# Patient Record
Sex: MALE | Race: WHITE | Employment: FULL TIME
[De-identification: names, ages, dates, MRNs, and addresses within clinical notes are randomized per-mention and may not be internally consistent; named-entity substitution may affect disease eponyms.]

---

## 2023-03-01 ENCOUNTER — NURSE TRIAGE (OUTPATIENT)
Dept: OTHER | Facility: CLINIC | Age: 38
End: 2023-03-01

## 2023-03-01 NOTE — TELEPHONE ENCOUNTER
Location of patient: [address] call from [place] [facility]; Patient with Red Flag Complaint requesting to establish care with new PCP at [facility]. Limited Triage d/t patient not with caller    Subjective: Caller states \"He's had this varicose vein from a soccer injury in high school. [facility]  and he said if it wasn't bothering him, it shouldn't be a an issue. Yesterday it started swelling up and throbbing and it's hurting. \"     Current Symptoms: varicose vein in right leg-throbbing, leg swelling-ankle to knee on right leg, NO color change, NO difficulty breathing    No personal hx of blood clot, mother has hx of blood clot after surgery    Onset: 2 days ago; worsening    Associated Symptoms: reduced activity    Pain Severity: Unable to rate d/t patient not with caller    Temperature: Denies    What has been tried: Nothing    LMP: NA Pregnant: NA    Recommended disposition: Go to ED/UCC Now (Or to Office with PCP Approval). Caller agreeable. Care advice provided, patient verbalizes understanding; denies any other questions or concerns; instructed to call back for any new or worsening symptoms. Patient/caller agrees to proceed to [facility] Emergency Department. Attention Provider: Thank you for allowing me to participate in the care of your patient. The patient was connected to triage in response to information provided to the ECC. Please do not respond through this encounter as the response is not directed to a shared pool.     Reason for Disposition   Thigh or calf pain and only 1 side and present > 1 hour    Protocols used: Leg Swelling and Edema-ADULT-[place]

## 2023-03-03 ENCOUNTER — TELEPHONE (OUTPATIENT)
Dept: VASCULAR SURGERY | Facility: CLINIC | Age: 38
End: 2023-03-03
Payer: COMMERCIAL

## 2023-03-06 ENCOUNTER — OFFICE VISIT (OUTPATIENT)
Dept: VASCULAR SURGERY | Facility: CLINIC | Age: 38
End: 2023-03-06
Payer: COMMERCIAL

## 2023-03-06 VITALS
HEIGHT: 68 IN | HEART RATE: 84 BPM | OXYGEN SATURATION: 97 % | BODY MASS INDEX: 34.86 KG/M2 | WEIGHT: 230 LBS | DIASTOLIC BLOOD PRESSURE: 82 MMHG | SYSTOLIC BLOOD PRESSURE: 132 MMHG

## 2023-03-06 DIAGNOSIS — R60.0 EDEMA OF BOTH LOWER LEGS: ICD-10-CM

## 2023-03-06 DIAGNOSIS — I87.2 VENOUS (PERIPHERAL) INSUFFICIENCY: ICD-10-CM

## 2023-03-06 DIAGNOSIS — I83.11 VARICOSE VEINS OF RIGHT LOWER EXTREMITY WITH INFLAMMATION: Primary | ICD-10-CM

## 2023-03-06 PROCEDURE — 99204 OFFICE O/P NEW MOD 45 MIN: CPT | Performed by: SURGERY

## 2023-03-06 RX ORDER — ASPIRIN 81 MG/1
81 TABLET ORAL DAILY
COMMUNITY

## 2023-03-06 NOTE — PROGRESS NOTES
03/06/2023      Referring, [place] [address]    Silverio Ortiz  1985    Chief Complaint   Patient presents with   • NEW PATIENT     Self referring for varicose veins. Patient states that most issue is in right leg. Started using treadmill and that's when he started noticing pain. Patient has not tried wearing compressions.        Dear Self Referring:      HPI  I had the pleasure of seeing your patient Silverio Ortiz in the office today.  Thank you kindly for this consultation.  As you recall, Silverio Ortiz is a 37 y.o.  male who you are currently following for general medical care.  He is referred to the vascular surgery office today for evaluation of some lower extremity edema and varicose veins, mainly in the right lower extremity.  She notes the presence of varicose veins in the right medial thigh and lower leg for several years however more recently that he has noted they have become more prominent and at times giving him some discomfort.  This is especially with prolonged standing or at times with some exercise.  Lower extremity edema is somewhat improved with leg elevation and lying supine.  He otherwise denies any arterial symptoms with no claudication or rest pain.  He has no history of DVT.  He otherwise has no significant past medical history and does not take any regular medications other than an 81 mg aspirin daily.    History reviewed. No pertinent past medical history.    History reviewed. No pertinent surgical history.    Family History   Problem Relation Age of Onset   • Atrial fibrillation Mother    • Stroke Mother        Social History     Socioeconomic History   • Marital status:    Tobacco Use   • Smoking status: Never   Substance and Sexual Activity   • Alcohol use: Yes     Alcohol/week: 10.0 standard drinks     Types: 5 Cans of beer, 5 Shots of liquor per week   • Drug use: Never   • Sexual activity: Yes     Partners: Female     Birth  "control/protection: None       No Known Allergies    Prior to Admission medications    Medication Sig Start Date End Date Taking? Authorizing Provider   aspirin 81 MG EC tablet Take 1 tablet by mouth Daily.   Yes Provider, Historical, MD       Review of Systems   Constitutional: Negative.  Negative for activity change, appetite change, chills, diaphoresis, fatigue and fever.   HENT: Negative.  Negative for congestion, sneezing, sore throat and trouble swallowing.    Eyes: Negative.  Negative for visual disturbance.   Respiratory: Negative.  Negative for chest tightness and shortness of breath.    Cardiovascular: Positive for leg swelling. Negative for chest pain and palpitations.   Gastrointestinal: Negative.  Negative for abdominal distention, abdominal pain, nausea and vomiting.   Endocrine: Negative.    Genitourinary: Negative.    Musculoskeletal: Negative.    Skin: Negative.         Varicose veins mainly on the right lower extremity.  At times these are uncomfortable with prolonged standing and sometimes with exercise.   Allergic/Immunologic: Negative.    Neurological: Negative.    Hematological: Negative.    Psychiatric/Behavioral: Negative.        /82 (BP Location: Right arm, Patient Position: Sitting, Cuff Size: Adult)   Pulse 84   Ht 172.7 cm (68\")   Wt 104 kg (230 lb)   SpO2 97%   BMI 34.97 kg/m²   Physical Exam  Vitals reviewed.   Constitutional:       Appearance: Normal appearance.   HENT:      Head: Normocephalic and atraumatic.      Nose: Nose normal.      Mouth/Throat:      Mouth: Mucous membranes are moist.   Eyes:      Extraocular Movements: Extraocular movements intact.      Pupils: Pupils are equal, round, and reactive to light.   Cardiovascular:      Rate and Rhythm: Normal rate and regular rhythm.      Pulses: Normal pulses.           Carotid pulses are 2+ on the right side and 2+ on the left side.       Radial pulses are 2+ on the right side and 2+ on the left side.        Femoral " pulses are 2+ on the right side and 2+ on the left side.       Popliteal pulses are 2+ on the right side and 2+ on the left side.        Dorsalis pedis pulses are 2+ on the right side and 2+ on the left side.        Posterior tibial pulses are 2+ on the right side and 2+ on the left side.      Comments: Mild edema of the bilateral lower extremities from ankle to knee.  Slightly worse on the right compared to the left.  He has some varicosities in the right lower extremity in the medial thigh as well as the medial lower leg.  These are supple with no significant overlying skin changes.  He otherwise has palpable pulses throughout the bilateral lower extremities.  Pulmonary:      Effort: Pulmonary effort is normal. No respiratory distress.   Abdominal:      General: There is no distension.      Palpations: Abdomen is soft. There is no mass.      Tenderness: There is no abdominal tenderness.   Musculoskeletal:         General: Normal range of motion.      Cervical back: Normal range of motion and neck supple.      Right lower leg: Edema present.      Left lower leg: Edema present.   Skin:     General: Skin is warm and dry.      Capillary Refill: Capillary refill takes less than 2 seconds.   Neurological:      General: No focal deficit present.      Mental Status: He is alert and oriented to person, place, and time.   Psychiatric:         Mood and Affect: Mood normal.         Behavior: Behavior normal.         Thought Content: Thought content normal.         Judgment: Judgment normal.         No results found.    There is no problem list on file for this patient.        ICD-10-CM ICD-9-CM   1. Varicose veins of right lower extremity with inflammation  I83.11 454.1   2. Venous (peripheral) insufficiency  I87.2 459.81   3. Edema of both lower legs  R60.0 782.3       Lab Frequency Next Occurrence   US Venous Doppler Lower Extremity Bilateral (duplex) Once 06/04/2023       Plan: After thoroughly evaluating Silverio  Angel, I believe the best course of action is to initially remain conservative from a vascular standpoint.  Cynically he has evidence of venous insufficiency with some edema that is worse with prolonged standing and somewhat improved with leg elevation and lying supine.  He also has some symptomatic varicosities mainly in the right lower extremity that have been more prominent recently. I will give the patient a prescription for compression stockings in the 20-30 mm pressure gradient range.  I did instruct the patient on how to wear these on a daily basis.  I would like him to keep his legs elevated when he is not on them, and keep his legs well moisturized.  We will see the patient back in 3 months with a venous valvular insufficiency study. If the testing does show significant venous reflux, the patient may be a great candidate for endovenous closure as the patient's symptoms have significantly impacted their activities of daily living.  The patient can continue taking their current medication regimen as previously planned.  BMI today is 34.9.  I will have him follow-up with his primary care physician for further evaluation and recommended proper nutrition and exercise to help improve this. This was all discussed in full with complete understanding.    Thank you for allowing me to participate in the care of your patient.  Please do not hesitate with any questions or concerns.  I will keep you aware of any further encounters with Silverio Ortiz.        Sincerely yours,         Arnaldo Albarado MD

## 2023-03-07 ENCOUNTER — PATIENT ROUNDING (BHMG ONLY) (OUTPATIENT)
Dept: VASCULAR SURGERY | Facility: CLINIC | Age: 38
End: 2023-03-07
Payer: COMMERCIAL

## 2023-03-07 NOTE — PROGRESS NOTES
March 7, 2023    Hello, may I speak with Silverio Ortiz?    My name is MAXIMO      I am  with [facility]  [address], [address]  [place] [address] [phone].    Before we get started may I verify your date of birth? 1985    I am calling to officially welcome you to our practice and ask about your recent visit. Is this a good time to talk? yes    Tell me about your visit with us. What things went well?  GOOD!       We're always looking for ways to make our patients' experiences even better. Do you have recommendations on ways we may improve?  no, WE WERE INFORMED THAT [place] WAS IN SURGERY BUT IT STILL WASN'T THAT LONG OF A WAIT.    Overall were you satisfied with your first visit to our practice? yes       I appreciate you taking the time to speak with me today. Is there anything else I can do for you? no      Thank you, and have a great day.

## 2023-06-09 ENCOUNTER — OFFICE VISIT (OUTPATIENT)
Dept: VASCULAR SURGERY | Facility: CLINIC | Age: 38
End: 2023-06-09
Payer: COMMERCIAL

## 2023-06-09 ENCOUNTER — HOSPITAL ENCOUNTER (OUTPATIENT)
Dept: ULTRASOUND IMAGING | Facility: HOSPITAL | Age: 38
Discharge: HOME | End: 2023-06-09
Payer: COMMERCIAL

## 2023-06-09 VITALS
HEART RATE: 78 BPM | OXYGEN SATURATION: 98 % | HEIGHT: 68 IN | SYSTOLIC BLOOD PRESSURE: 128 MMHG | WEIGHT: 230.2 LBS | BODY MASS INDEX: 34.89 KG/M2 | RESPIRATION RATE: 18 BRPM | DIASTOLIC BLOOD PRESSURE: 76 MMHG

## 2023-06-09 DIAGNOSIS — I83.11 VARICOSE VEINS OF RIGHT LOWER EXTREMITY WITH INFLAMMATION: ICD-10-CM

## 2023-06-09 DIAGNOSIS — I87.2 VENOUS (PERIPHERAL) INSUFFICIENCY: ICD-10-CM

## 2023-06-09 DIAGNOSIS — R60.0 EDEMA OF BOTH LOWER LEGS: ICD-10-CM

## 2023-06-09 DIAGNOSIS — I83.11 VARICOSE VEINS OF RIGHT LOWER EXTREMITY WITH INFLAMMATION: Primary | ICD-10-CM

## 2023-06-09 DIAGNOSIS — I87.323 CHRONIC VENOUS HYPERTENSION WITH INFLAMMATION INVOLVING BOTH SIDES: ICD-10-CM

## 2023-06-09 PROCEDURE — 93970 EXTREMITY STUDY: CPT

## 2023-06-09 NOTE — PROGRESS NOTES
06/09/2023      Hari Saavedra DO  [address]  [place] [address]        Silverio Ortiz  1985    Chief Complaint   Patient presents with   • Varicose veins of right lower extremity with inflammation     Patient states that he is here for a FU, no issues.        Dear Hari Saavedra DO:    HPI     I had the pleasure of seeing your patient in the office today for follow up.  As you recall, the patient is a 38 y.o. male who we are currently following for lower extremity edema and symptomatic varicose veins.  He returns today after having undergone venous duplex with insufficiency study which I did personally review in the office.  It does show evidence of significant reflux in the right greater saphenous vein as well as an anterior accessory saphenous vein.  There was also deep venous reflux in the common femoral and popliteal veins.  On the left there is significant reflux in the greater saphenous vein only.  There was no evidence of DVT.  Since our last visit he has been wearing compression in the 20 to 30 mmHg range as well as using leg elevation and exercise.  He notes this improves his symptoms with less edema and heaviness as well as less discomfort.  However even with compression he still does continue to have some symptoms and some discomfort especially in the varicosities in the right lower extremity.  He otherwise denies any arterial symptoms with no claudication or rest pain.  He has no other acute complaints today.      Review of Systems   Constitutional: Negative.  Negative for activity change, appetite change, chills, diaphoresis, fatigue and fever.   HENT: Negative.  Negative for congestion, sneezing, sore throat and trouble swallowing.    Eyes: Negative.  Negative for visual disturbance.   Respiratory: Negative.  Negative for chest tightness and shortness of breath.    Cardiovascular: Positive for leg swelling. Negative for chest pain and palpitations.   Gastrointestinal: Negative.   "Negative for abdominal distention, abdominal pain, nausea and vomiting.   Endocrine: Negative.    Genitourinary: Negative.    Musculoskeletal: Negative.    Skin: Negative.         Varicose veins mainly on the right lower extremity.  At times these are uncomfortable with prolonged standing and sometimes with exercise.   Allergic/Immunologic: Negative.    Neurological: Negative.    Hematological: Negative.    Psychiatric/Behavioral: Negative.        /76 (BP Location: Right arm, Patient Position: Sitting, Cuff Size: Adult)   Pulse 78   Resp 18   Ht 172.7 cm (68\")   Wt 104 kg (230 lb 3.2 oz)   SpO2 98%   BMI 35.00 kg/m²   Physical Exam  Vitals reviewed.   Constitutional:       Appearance: Normal appearance.   HENT:      Head: Normocephalic and atraumatic.      Nose: Nose normal.      Mouth/Throat:      Mouth: Mucous membranes are moist.   Eyes:      Extraocular Movements: Extraocular movements intact.      Pupils: Pupils are equal, round, and reactive to light.   Cardiovascular:      Rate and Rhythm: Normal rate and regular rhythm.      Pulses: Normal pulses.           Carotid pulses are 2+ on the right side and 2+ on the left side.       Radial pulses are 2+ on the right side and 2+ on the left side.        Femoral pulses are 2+ on the right side and 2+ on the left side.       Popliteal pulses are 2+ on the right side and 2+ on the left side.        Dorsalis pedis pulses are 2+ on the right side and 2+ on the left side.        Posterior tibial pulses are 2+ on the right side and 2+ on the left side.      Comments: Mild edema of the bilateral lower extremities from ankle to knee.  Slightly worse on the right compared to the left.  He has some varicosities in the right lower extremity in the medial thigh as well as the medial lower leg.  These are supple with no significant overlying skin changes.  He otherwise has palpable pulses throughout the bilateral lower extremities.  Pulmonary:      Effort: Pulmonary " effort is normal. No respiratory distress.   Abdominal:      General: There is no distension.      Palpations: Abdomen is soft. There is no mass.      Tenderness: There is no abdominal tenderness.   Musculoskeletal:         General: Normal range of motion.      Cervical back: Normal range of motion and neck supple.      Right lower leg: Edema present.      Left lower leg: Edema present.   Skin:     General: Skin is warm and dry.      Capillary Refill: Capillary refill takes less than 2 seconds.   Neurological:      General: No focal deficit present.      Mental Status: He is alert and oriented to person, place, and time.   Psychiatric:         Mood and Affect: Mood normal.         Behavior: Behavior normal.         Thought Content: Thought content normal.         Judgment: Judgment normal.         DIAGNOSTIC DATA:        No results found.    There is no problem list on file for this patient.        ICD-10-CM ICD-9-CM   1. Varicose veins of right lower extremity with inflammation  I83.11 454.1   2. Venous (peripheral) insufficiency  I87.2 459.81   3. Edema of both lower legs  R60.0 782.3   4. Chronic venous hypertension with inflammation involving both sides  I87.323 459.32           PLAN: After thoroughly evaluating Silverio Ortiz, I believe the best course of action is to proceed with right lower extremity greater saphenous vein and anterior accessory saphenous vein radiofrequency ablation.  He returns today after having undergone venous duplex with insufficiency study which I did personally review.  It does show evidence of significant reflux in the bilateral greater saphenous veins, as well as a right anterior accessory saphenous vein.  There was also evidence of deep venous reflux on the right in the common femoral and popliteal veins.  Since our last visit 3 months ago he has been wearing compression in the 20 to 30 mmHg range to the bilateral lower extremities on a daily basis and also using leg elevation  and exercise.  While this has worked to improve his symptoms, he still has heaviness, discomfort, and symptoms in his varicosities especially in the right lower extremity.  Given his ongoing symptoms despite conservative management I think he would benefit from right greater saphenous and anterior accessory saphenous vein radiofrequency ablation. Risks of radiofrequency ablation include, but are not limited to, bleeding, infection, vessel damage, nerve damage, DVT, phlebitis, and pulmonary embolus.  The patient understands these risks and wishes to proceed with procedure.  In the meantime he should continue his compression in the 20 to 30 mmHg range on a daily basis, along with his leg elevation and exercise to continue managing his symptoms.  The patient is to continue taking their medications as previously discussed. Class 2 Severe Obesity (BMI >=35 and <=39.9). Obesity-related health conditions include the following: lower extremity venous stasis disease. Obesity is unchanged. BMI is is above average; BMI management plan is completed. We discussed portion control and increasing exercise. This was all discussed in full with complete understanding.  Thank you for allowing me to participate in the care of your patient.  Please do not hesitate to call with any questions or concerns.  We will keep you aware of any further encounters with Silverio Ortiz.      Sincerely Yours,      Arnaldo Albarado MD

## 2023-06-09 NOTE — H&P
HPI     I had the pleasure of seeing your patient in the office today for follow up.  As you recall, the patient is a 38 y.o. male who we are currently following for lower extremity edema and symptomatic varicose veins.  He returns today after having undergone venous duplex with insufficiency study which I did personally review in the office.  It does show evidence of significant reflux in the right greater saphenous vein as well as an anterior accessory saphenous vein.  There was also deep venous reflux in the common femoral and popliteal veins.  On the left there is significant reflux in the greater saphenous vein only.  There was no evidence of DVT.  Since our last visit he has been wearing compression in the 20 to 30 mmHg range as well as using leg elevation and exercise.  He notes this improves his symptoms with less edema and heaviness as well as less discomfort.  However even with compression he still does continue to have some symptoms and some discomfort especially in the varicosities in the right lower extremity.  He otherwise denies any arterial symptoms with no claudication or rest pain.  He has no other acute complaints today.      Review of Systems   Constitutional: Negative.  Negative for activity change, appetite change, chills, diaphoresis, fatigue and fever.   HENT: Negative.  Negative for congestion, sneezing, sore throat and trouble swallowing.    Eyes: Negative.  Negative for visual disturbance.   Respiratory: Negative.  Negative for chest tightness and shortness of breath.    Cardiovascular: Positive for leg swelling. Negative for chest pain and palpitations.   Gastrointestinal: Negative.  Negative for abdominal distention, abdominal pain, nausea and vomiting.   Endocrine: Negative.    Genitourinary: Negative.    Musculoskeletal: Negative.    Skin: Negative.         Varicose veins mainly on the right lower extremity.  At times these are uncomfortable with prolonged standing and sometimes with  "exercise.   Allergic/Immunologic: Negative.    Neurological: Negative.    Hematological: Negative.    Psychiatric/Behavioral: Negative.        /76 (BP Location: Right arm, Patient Position: Sitting, Cuff Size: Adult)   Pulse 78   Resp 18   Ht 172.7 cm (68\")   Wt 104 kg (230 lb 3.2 oz)   SpO2 98%   BMI 35.00 kg/m²   Physical Exam  Vitals reviewed.   Constitutional:       Appearance: Normal appearance.   HENT:      Head: Normocephalic and atraumatic.      Nose: Nose normal.      Mouth/Throat:      Mouth: Mucous membranes are moist.   Eyes:      Extraocular Movements: Extraocular movements intact.      Pupils: Pupils are equal, round, and reactive to light.   Cardiovascular:      Rate and Rhythm: Normal rate and regular rhythm.      Pulses: Normal pulses.           Carotid pulses are 2+ on the right side and 2+ on the left side.       Radial pulses are 2+ on the right side and 2+ on the left side.        Femoral pulses are 2+ on the right side and 2+ on the left side.       Popliteal pulses are 2+ on the right side and 2+ on the left side.        Dorsalis pedis pulses are 2+ on the right side and 2+ on the left side.        Posterior tibial pulses are 2+ on the right side and 2+ on the left side.      Comments: Mild edema of the bilateral lower extremities from ankle to knee.  Slightly worse on the right compared to the left.  He has some varicosities in the right lower extremity in the medial thigh as well as the medial lower leg.  These are supple with no significant overlying skin changes.  He otherwise has palpable pulses throughout the bilateral lower extremities.  Pulmonary:      Effort: Pulmonary effort is normal. No respiratory distress.   Abdominal:      General: There is no distension.      Palpations: Abdomen is soft. There is no mass.      Tenderness: There is no abdominal tenderness.   Musculoskeletal:         General: Normal range of motion.      Cervical back: Normal range of motion and neck " supple.      Right lower leg: Edema present.      Left lower leg: Edema present.   Skin:     General: Skin is warm and dry.      Capillary Refill: Capillary refill takes less than 2 seconds.   Neurological:      General: No focal deficit present.      Mental Status: He is alert and oriented to person, place, and time.   Psychiatric:         Mood and Affect: Mood normal.         Behavior: Behavior normal.         Thought Content: Thought content normal.         Judgment: Judgment normal.         DIAGNOSTIC DATA:        No results found.    There is no problem list on file for this patient.        ICD-10-CM ICD-9-CM   1. Varicose veins of right lower extremity with inflammation  I83.11 454.1   2. Venous (peripheral) insufficiency  I87.2 459.81   3. Edema of both lower legs  R60.0 782.3   4. Chronic venous hypertension with inflammation involving both sides  I87.323 459.32           PLAN: After thoroughly evaluating Silverio Ortiz, I believe the best course of action is to proceed with right lower extremity greater saphenous vein and anterior accessory saphenous vein radiofrequency ablation.  He returns today after having undergone venous duplex with insufficiency study which I did personally review.  It does show evidence of significant reflux in the bilateral greater saphenous veins, as well as a right anterior accessory saphenous vein.  There was also evidence of deep venous reflux on the right in the common femoral and popliteal veins.  Since our last visit 3 months ago he has been wearing compression in the 20 to 30 mmHg range to the bilateral lower extremities on a daily basis and also using leg elevation and exercise.  While this has worked to improve his symptoms, he still has heaviness, discomfort, and symptoms in his varicosities especially in the right lower extremity.  Given his ongoing symptoms despite conservative management I think he would benefit from right greater saphenous and anterior accessory  saphenous vein radiofrequency ablation. Risks of radiofrequency ablation include, but are not limited to, bleeding, infection, vessel damage, nerve damage, DVT, phlebitis, and pulmonary embolus.  The patient understands these risks and wishes to proceed with procedure.  In the meantime he should continue his compression in the 20 to 30 mmHg range on a daily basis, along with his leg elevation and exercise to continue managing his symptoms.  The patient is to continue taking their medications as previously discussed. Class 2 Severe Obesity (BMI >=35 and <=39.9). Obesity-related health conditions include the following: lower extremity venous stasis disease. Obesity is unchanged. BMI is is above average; BMI management plan is completed. We discussed portion control and increasing exercise. This was all discussed in full with complete understanding.

## 2023-06-12 ENCOUNTER — TELEPHONE (OUTPATIENT)
Dept: VASCULAR SURGERY | Facility: CLINIC | Age: 38
End: 2023-06-12
Payer: COMMERCIAL

## 2023-06-12 PROBLEM — I87.2 VENOUS (PERIPHERAL) INSUFFICIENCY: Status: ACTIVE | Noted: 2023-06-12

## 2023-06-12 PROBLEM — I87.323 CHRONIC VENOUS HYPERTENSION WITH INFLAMMATION INVOLVING BOTH SIDES: Status: ACTIVE | Noted: 2023-06-12

## 2023-06-12 PROBLEM — R60.0 EDEMA OF BOTH LOWER LEGS: Status: ACTIVE | Noted: 2023-06-12

## 2023-06-12 PROBLEM — I83.11 VARICOSE VEINS OF RIGHT LOWER EXTREMITY WITH INFLAMMATION: Status: ACTIVE | Noted: 2023-06-12

## 2023-06-12 NOTE — TELEPHONE ENCOUNTER
Pt expressed understanding of prework/surgery time and instruction for procedure scheduled 08/17/23 with Dr. Albarado.  NPO after midnight.

## 2023-07-31 ENCOUNTER — TELEPHONE (OUTPATIENT)
Dept: VASCULAR SURGERY | Facility: CLINIC | Age: 38
End: 2023-07-31
Payer: COMMERCIAL

## 2023-08-02 ENCOUNTER — TELEPHONE (OUTPATIENT)
Dept: VASCULAR SURGERY | Facility: CLINIC | Age: 38
End: 2023-08-02
Payer: COMMERCIAL

## 2023-08-02 ENCOUNTER — PRE-ADMISSION TESTING (OUTPATIENT)
Dept: PREADMISSION TESTING | Facility: HOSPITAL | Age: 38
End: 2023-08-02
Payer: COMMERCIAL

## 2023-08-02 VITALS
OXYGEN SATURATION: 98 % | HEIGHT: 68 IN | BODY MASS INDEX: 34.68 KG/M2 | RESPIRATION RATE: 18 BRPM | SYSTOLIC BLOOD PRESSURE: 136 MMHG | DIASTOLIC BLOOD PRESSURE: 83 MMHG | HEART RATE: 72 BPM | WEIGHT: 228.84 LBS

## 2023-08-02 DIAGNOSIS — R60.0 EDEMA OF BOTH LOWER LEGS: ICD-10-CM

## 2023-08-02 DIAGNOSIS — I87.2 VENOUS (PERIPHERAL) INSUFFICIENCY: ICD-10-CM

## 2023-08-02 DIAGNOSIS — I83.11 VARICOSE VEINS OF RIGHT LOWER EXTREMITY WITH INFLAMMATION: ICD-10-CM

## 2023-08-02 DIAGNOSIS — I87.323 CHRONIC VENOUS HYPERTENSION WITH INFLAMMATION INVOLVING BOTH SIDES: ICD-10-CM

## 2023-08-02 LAB
ANION GAP SERPL CALCULATED.3IONS-SCNC: 11 MMOL/L (ref 5–15)
BASOPHILS # BLD AUTO: 0.02 10*3/MM3 (ref 0–0.2)
BASOPHILS NFR BLD AUTO: 0.4 % (ref 0–1.5)
BUN SERPL-MCNC: 12 MG/DL (ref 6–20)
BUN/CREAT SERPL: 16.2 (ref 7–25)
CALCIUM SPEC-SCNC: 9.6 MG/DL (ref 8.6–10.5)
CHLORIDE SERPL-SCNC: 104 MMOL/L (ref 98–107)
CO2 SERPL-SCNC: 24 MMOL/L (ref 22–29)
CREAT SERPL-MCNC: 0.74 MG/DL (ref 0.76–1.27)
DEPRECATED RDW RBC AUTO: 43 FL (ref 37–54)
EGFRCR SERPLBLD CKD-EPI 2021: 117.5 ML/MIN/1.73
EOSINOPHIL # BLD AUTO: 0.05 10*3/MM3 (ref 0–0.4)
EOSINOPHIL NFR BLD AUTO: 0.9 % (ref 0.3–6.2)
ERYTHROCYTE [DISTWIDTH] IN BLOOD BY AUTOMATED COUNT: 12.6 % (ref 12.3–15.4)
GLUCOSE SERPL-MCNC: 95 MG/DL (ref 65–99)
HCT VFR BLD AUTO: 42.2 % (ref 37.5–51)
HGB BLD-MCNC: 14.3 G/DL (ref 13–17.7)
IMM GRANULOCYTES # BLD AUTO: 0.02 10*3/MM3 (ref 0–0.05)
IMM GRANULOCYTES NFR BLD AUTO: 0.4 % (ref 0–0.5)
LYMPHOCYTES # BLD AUTO: 1.63 10*3/MM3 (ref 0.7–3.1)
LYMPHOCYTES NFR BLD AUTO: 29 % (ref 19.6–45.3)
MCH RBC QN AUTO: 31.4 PG (ref 26.6–33)
MCHC RBC AUTO-ENTMCNC: 33.9 G/DL (ref 31.5–35.7)
MCV RBC AUTO: 92.7 FL (ref 79–97)
MONOCYTES # BLD AUTO: 0.38 10*3/MM3 (ref 0.1–0.9)
MONOCYTES NFR BLD AUTO: 6.8 % (ref 5–12)
NEUTROPHILS NFR BLD AUTO: 3.52 10*3/MM3 (ref 1.7–7)
NEUTROPHILS NFR BLD AUTO: 62.5 % (ref 42.7–76)
NRBC BLD AUTO-RTO: 0 /100 WBC (ref 0–0.2)
PLATELET # BLD AUTO: 217 10*3/MM3 (ref 140–450)
PMV BLD AUTO: 7.8 FL (ref 6–12)
POTASSIUM SERPL-SCNC: 3.9 MMOL/L (ref 3.5–5.2)
RBC # BLD AUTO: 4.55 10*6/MM3 (ref 4.14–5.8)
SODIUM SERPL-SCNC: 139 MMOL/L (ref 136–145)
WBC NRBC COR # BLD: 5.62 10*3/MM3 (ref 3.4–10.8)

## 2023-08-02 PROCEDURE — 85025 COMPLETE CBC W/AUTO DIFF WBC: CPT

## 2023-08-02 PROCEDURE — 80048 BASIC METABOLIC PNL TOTAL CA: CPT

## 2023-08-02 PROCEDURE — 93010 ELECTROCARDIOGRAM REPORT: CPT | Performed by: INTERNAL MEDICINE

## 2023-08-02 PROCEDURE — 36415 COLL VENOUS BLD VENIPUNCTURE: CPT

## 2023-08-02 PROCEDURE — 93005 ELECTROCARDIOGRAM TRACING: CPT

## 2023-08-02 RX ORDER — MULTIPLE VITAMINS W/ MINERALS TAB 9MG-400MCG
1 TAB ORAL DAILY
COMMUNITY

## 2023-08-02 NOTE — DISCHARGE INSTRUCTIONS
Before you come to the hospital        Arrival time: AS DIRECTED BY OFFICE     YOU MAY TAKE THE FOLLOWING MEDICATION(S) THE MORNING OF SURGERY WITH A SIP OF WATER: AS DIRECTED BY            ALL OTHER HOME MEDICATION CHECK WITH YOUR PHYSICIAN (especially if   you are taking diabetes medicines or blood thinners)    Do not take any Erectile Dysfunction medications (EX: CIALIS, VIAGRA) 24 hours prior to surgery.      If you were given and instructed to use a germ- killing soap, use as directed the night before surgery and again the morning of surgery or as directed by your surgeon. (Use one-half of the bottle with each shower.)   See attached information for How to Use Chlorhexidine for Bathing if applicable.            Eating and drinking restrictions prior to scheduled arrival time    2 Hours before arrival time STOP   Drinking Clear liquids (water, black coffee-NO CREAM,  apple juice-no pulp)      8 Hours before arrival time STOP   All food, full liquids, and dairy products    (It is extremely important that you follow these guidelines to prevent delay or cancelation of your procedure)     Clear Liquids  Water and flavored water                                                                      Clear Fruit juices, such as cranberry juice and apple juice.  Black coffee (NO cream of any kind, including powdered).  Plain tea  Clear bouillon or broth.  Flavored gelatin.  Soda.  Gatorade or Powerade.  Full liquid examples  Juices that have pulp.  Frozen ice pops that contain fruit pieces.  Coffee with creamer  Milk.  Yogurt.                MANAGING PAIN AFTER SURGERY    We know you are probably wondering what your pain will be like after surgery.  Following surgery it is unrealistic to expect you will not have pain.   Pain is how our bodies let us know that something is wrong or cautions us to be careful.  That said, our goal is to make your pain tolerable.    Methods we may use to treat your pain include (oral or IV  medications, PCAs, epidurals, nerve blocks, etc.)   While some procedures require IV pain medications for a short time after surgery, transitioning to pain medications by mouth allows for better management of pain.   Your nurse will encourage you to take oral pain medications whenever possible.  IV medications work almost immediately, but only last a short while.  Taking medications by mouth allows for a more constant level of medication in your blood stream for a longer period of time.      Once your pain is out of control it is harder to get back under control.  It is important you are aware when your next dose of pain medication is due.  If you are admitted, your nurse may write the time of your next dose on the white board in your room to help you remember.      We are interested in your pain and encourage you to inform us about aggravating factors during your visit.   Many times a simple repositioning every few hours can make a big difference.    If your physician says it is okay, do not let your pain prevent you from getting out of bed. Be sure to call your nurse for assistance prior to getting up so you do not fall.      Before surgery, please decide your tolerable pain goal.  These faces help describe the pain ratings we use on a 0-10 scale.   Be prepared to tell us your goal and whether or not you take pain or anxiety medications at home.          Preparing for Surgery  Preparing for surgery is an important part of your care. It can make things go more smoothly and help you avoid complications. The steps leading up to surgery may vary among hospitals. Follow all instructions given to you by your health care providers. Ask questions if you do not understand something. Talk about any concerns that you have.  Here are some questions to consider asking before your surgery:  If my surgery is not an emergency (is elective), when would be the best time to have the surgery?  What arrangements do I need to make for  work, home, or school?  What will my recovery be like? How long will it be before I can return to normal activities?  Will I need to prepare my home? Will I need to arrange care for me or my children?  Should I expect to have pain after surgery? What are my pain management options? Are there nonmedical options that I can try for pain?  Tell a health care provider about:  Any allergies you have.  All medicines you are taking, including vitamins, herbs, eye drops, creams, and over-the-counter medicines.  Any problems you or family members have had with anesthetic medicines.  Any blood disorders you have.  Any surgeries you have had.  Any medical conditions you have.  Whether you are pregnant or may be pregnant.  What are the risks?  The risks and complications of surgery depend on the specific procedure that you have. Discuss all the risks with your health care providers before your surgery. Ask about common surgical complications, which may include:  Infection.  Bleeding or a need for blood replacement (transfusion).  Allergic reactions to medicines.  Damage to surrounding nerves, tissues, or structures.  A blood clot.  Scarring.  Failure of the surgery to correct the problem.  Follow these instructions before the procedure:  Several days or weeks before your procedure  You may have a physical exam by your primary health care provider to make sure it is safe for you to have surgery.  You may have testing. This may include a chest X-ray, blood and urine tests, electrocardiogram (ECG), or other testing.  Ask your health care provider about:  Changing or stopping your regular medicines. This is especially important if you are taking diabetes medicines or blood thinners.  Taking medicines such as aspirin and ibuprofen. These medicines can thin your blood. Do not take these medicines unless your health care provider tells you to take them.  Taking over-the-counter medicines, vitamins, herbs, and supplements.  Do not use  any products that contain nicotine or tobacco, such as cigarettes and e-cigarettes. If you need help quitting, ask your health care provider.  Avoid alcohol.  Ask your health care provider if there are exercises you can do to prepare for surgery.  Eat a healthy diet.   Plan to have someone 18 years of age or older to take you home from the hospital. We will need to verify your ride on the morning of surgery if you are being discharged home on the same day. Tell your ride to be expecting a call from the hospital prior to your procedure.   Plan to have a responsible adult care for you for at least 24 hours after you leave the hospital or clinic. This is important.  The day before your procedure  You may be given antibiotic medicine to take by mouth to help prevent infection. Take it as told by your health care provider.  You may be asked to shower with a germ-killing soap.  Follow instructions from your health care provider about eating and drinking restrictions. This includes gum, mints and hard candy.  Pack comfortable clothes according to your procedure.   The day of your procedure  You may need to take another shower with a germ-killing soap before you leave home in the morning.  With a small sip of water, take only the medicines that you are told to take.  Remove all jewelry including rings.   Leave anything you consider valuable at home except hearing aids if needed.  You do not need to bring your home medications into the hospital.   Do not wear any makeup, nail polish, powder, deodorant, lotion, hair accessories, or anything on your skin or body except your clothes.  If you will be staying in the hospital, bring a case to hold your glasses, contacts, or dentures. You may also want to bring your robe and non-skid footwear.       (Do not use denture adhesives since you will be asked to remove them during  surgery).   If you wear oxygen at home, bring it with you the day of surgery.  If instructed by your health  care provider, bring your sleep apnea device with you on the day of your surgery (if this applies to you).  You may want to leave your suitcase and sleep apnea device in the car until after surgery.   Arrive at the hospital as scheduled.  Bring a friend or family member with you who can help to answer questions and be present while you meet with your health care provider.  At the hospital  When you arrive at the hospital:  Go to registration located at the main entrance of the hospital. You will be registered and given a beeper and a sticker sheet. Take the stickers to the Outpatient nurses desk and place in the black tray. This is to notify staff that you have arrived. Then return to the lobby to wait.   When your beeper lights up and vibrates proceed through the double doors, under the stairs, and a member of the Outpatient Surgery staff will escort you to your preoperative room.  You may have to wear compression sleeves. These help to prevent blood clots and reduce swelling in your legs.  An IV may be inserted into one of your veins.              In the operating room, you may be given one or more of the following:        A medicine to help you relax (sedative).        A medicine to numb the area (local anesthetic).        A medicine to make you fall asleep (general anesthetic).        A medicine that is injected into an area of your body to numb everything below the                      injection site (regional anesthetic).  You may be given an antibiotic through your IV to help prevent infection.  Your surgical site will be marked or identified.    Contact a health care provider if you:  Develop a fever of more than 100.4øF (38øC) or other feelings of illness during the 48 hours before your surgery.  Have symptoms that get worse.  Have questions or concerns about your surgery.  Summary  Preparing for surgery can make the procedure go more smoothly and lower your risk of complications.  Before surgery, make a  list of questions and concerns to discuss with your surgeon. Ask about the risks and possible complications.  In the days or weeks before your surgery, follow all instructions from your health care provider. You may need to stop smoking, avoid alcohol, follow eating restrictions, and change or stop your regular medicines.  Contact your surgeon if you develop a fever or other signs of illness during the few days before your surgery.  This information is not intended to replace advice given to you by your health care provider. Make sure you discuss any questions you have with your health care provider.  Document Revised: 12/21/2018 Document Reviewed: 10/23/2018  [email or web address] Patient Education c 2021 [email or web address]

## 2023-08-03 ENCOUNTER — APPOINTMENT (OUTPATIENT)
Dept: ULTRASOUND IMAGING | Facility: HOSPITAL | Age: 38
End: 2023-08-03
Payer: COMMERCIAL

## 2023-08-03 ENCOUNTER — ANESTHESIA (OUTPATIENT)
Dept: PERIOP | Facility: HOSPITAL | Age: 38
End: 2023-08-03
Payer: COMMERCIAL

## 2023-08-03 ENCOUNTER — HOSPITAL ENCOUNTER (OUTPATIENT)
Facility: HOSPITAL | Age: 38
Setting detail: HOSPITAL OUTPATIENT SURGERY
Discharge: HOME | End: 2023-08-03
Attending: SURGERY | Admitting: SURGERY
Payer: COMMERCIAL

## 2023-08-03 ENCOUNTER — ANESTHESIA EVENT (OUTPATIENT)
Dept: PERIOP | Facility: HOSPITAL | Age: 38
End: 2023-08-03
Payer: COMMERCIAL

## 2023-08-03 VITALS
HEART RATE: 64 BPM | RESPIRATION RATE: 16 BRPM | DIASTOLIC BLOOD PRESSURE: 72 MMHG | SYSTOLIC BLOOD PRESSURE: 115 MMHG | TEMPERATURE: 99 F | OXYGEN SATURATION: 96 %

## 2023-08-03 DIAGNOSIS — I83.11 VARICOSE VEINS OF RIGHT LOWER EXTREMITY WITH INFLAMMATION: ICD-10-CM

## 2023-08-03 DIAGNOSIS — R60.0 EDEMA OF BOTH LOWER LEGS: ICD-10-CM

## 2023-08-03 DIAGNOSIS — I87.323 CHRONIC VENOUS HYPERTENSION WITH INFLAMMATION INVOLVING BOTH SIDES: ICD-10-CM

## 2023-08-03 DIAGNOSIS — I87.2 VENOUS (PERIPHERAL) INSUFFICIENCY: ICD-10-CM

## 2023-08-03 LAB
QT INTERVAL: 374 MS
QTC INTERVAL: 397 MS

## 2023-08-03 PROCEDURE — 36476 ENDOVENOUS RF VEIN ADD-ON: CPT | Performed by: SURGERY

## 2023-08-03 PROCEDURE — 36475 ENDOVENOUS RF 1ST VEIN: CPT | Performed by: SURGERY

## 2023-08-03 PROCEDURE — 0 LIDOCAINE 1 % SOLUTION: Performed by: SURGERY

## 2023-08-03 PROCEDURE — C1888 ENDOVAS NON-CARDIAC ABL CATH: HCPCS | Performed by: SURGERY

## 2023-08-03 PROCEDURE — C1894 INTRO/SHEATH, NON-LASER: HCPCS | Performed by: SURGERY

## 2023-08-03 PROCEDURE — S0260 H&P FOR SURGERY: HCPCS | Performed by: SURGERY

## 2023-08-03 PROCEDURE — 25010000002 CEFAZOLIN PER 500 MG: Performed by: SURGERY

## 2023-08-03 PROCEDURE — 25010000002 FENTANYL CITRATE (PF) 100 MCG/2ML SOLUTION: Performed by: NURSE ANESTHETIST, CERTIFIED REGISTERED

## 2023-08-03 PROCEDURE — 76937 US GUIDE VASCULAR ACCESS: CPT

## 2023-08-03 PROCEDURE — 25010000002 PROPOFOL 1000 MG/100ML EMULSION: Performed by: NURSE ANESTHETIST, CERTIFIED REGISTERED

## 2023-08-03 RX ORDER — SODIUM CHLORIDE 0.9 % (FLUSH) 0.9 %
3 SYRINGE (ML) INJECTION AS NEEDED
Status: DISCONTINUED | OUTPATIENT
Start: 2023-08-03 | End: 2023-08-03 | Stop reason: HOSPADM

## 2023-08-03 RX ORDER — FENTANYL CITRATE 50 UG/ML
INJECTION, SOLUTION INTRAMUSCULAR; INTRAVENOUS AS NEEDED
Status: DISCONTINUED | OUTPATIENT
Start: 2023-08-03 | End: 2023-08-03 | Stop reason: SURG

## 2023-08-03 RX ORDER — LIDOCAINE HYDROCHLORIDE 20 MG/ML
INJECTION, SOLUTION EPIDURAL; INFILTRATION; INTRACAUDAL; PERINEURAL AS NEEDED
Status: DISCONTINUED | OUTPATIENT
Start: 2023-08-03 | End: 2023-08-03 | Stop reason: SURG

## 2023-08-03 RX ORDER — LIDOCAINE HYDROCHLORIDE 10 MG/ML
0.5 INJECTION, SOLUTION EPIDURAL; INFILTRATION; INTRACAUDAL; PERINEURAL ONCE AS NEEDED
Status: DISCONTINUED | OUTPATIENT
Start: 2023-08-03 | End: 2023-08-03 | Stop reason: HOSPADM

## 2023-08-03 RX ORDER — SODIUM CHLORIDE 9 MG/ML
INJECTION, SOLUTION INTRAVENOUS AS NEEDED
Status: DISCONTINUED | OUTPATIENT
Start: 2023-08-03 | End: 2023-08-03 | Stop reason: HOSPADM

## 2023-08-03 RX ORDER — OXYCODONE HYDROCHLORIDE AND ACETAMINOPHEN 5; 325 MG/1; MG/1
1 TABLET ORAL ONCE AS NEEDED
Status: DISCONTINUED | OUTPATIENT
Start: 2023-08-03 | End: 2023-08-03 | Stop reason: HOSPADM

## 2023-08-03 RX ORDER — SODIUM CHLORIDE, SODIUM LACTATE, POTASSIUM CHLORIDE, CALCIUM CHLORIDE 600; 310; 30; 20 MG/100ML; MG/100ML; MG/100ML; MG/100ML
1000 INJECTION, SOLUTION INTRAVENOUS CONTINUOUS
Status: DISCONTINUED | OUTPATIENT
Start: 2023-08-03 | End: 2023-08-03 | Stop reason: HOSPADM

## 2023-08-03 RX ORDER — LIDOCAINE HYDROCHLORIDE 10 MG/ML
INJECTION, SOLUTION INFILTRATION; PERINEURAL AS NEEDED
Status: DISCONTINUED | OUTPATIENT
Start: 2023-08-03 | End: 2023-08-03 | Stop reason: HOSPADM

## 2023-08-03 RX ORDER — PROPOFOL 10 MG/ML
INJECTION, EMULSION INTRAVENOUS AS NEEDED
Status: DISCONTINUED | OUTPATIENT
Start: 2023-08-03 | End: 2023-08-03 | Stop reason: SURG

## 2023-08-03 RX ADMIN — FENTANYL CITRATE 100 MCG: 50 INJECTION, SOLUTION INTRAMUSCULAR; INTRAVENOUS at 11:10

## 2023-08-03 RX ADMIN — LIDOCAINE HYDROCHLORIDE 100 MG: 20 INJECTION, SOLUTION EPIDURAL; INFILTRATION; INTRACAUDAL; PERINEURAL at 11:07

## 2023-08-03 RX ADMIN — SODIUM CHLORIDE, POTASSIUM CHLORIDE, SODIUM LACTATE AND CALCIUM CHLORIDE 1000 ML: 600; 310; 30; 20 INJECTION, SOLUTION INTRAVENOUS at 08:33

## 2023-08-03 RX ADMIN — PROPOFOL 100 MCG/KG/MIN: 10 INJECTION, EMULSION INTRAVENOUS at 11:12

## 2023-08-03 RX ADMIN — CEFAZOLIN 2 G: 2 INJECTION, POWDER, FOR SOLUTION INTRAMUSCULAR; INTRAVENOUS at 11:10

## 2023-08-03 RX ADMIN — PROPOFOL 70 MG: 10 INJECTION, EMULSION INTRAVENOUS at 11:07

## 2023-08-03 NOTE — OP NOTE
Silverio Ortiz  8/3/2023     PREOPERATIVE DIAGNOSIS: Varicose veins of right lower extremity with inflammation [I83.11]  Venous (peripheral) insufficiency [I87.2]  Edema of both lower legs [R60.0]  Chronic venous hypertension with inflammation involving both sides [I87.323]     POSTOPERATIVE DIAGNOSIS: Post-Op Diagnosis Codes:     * Varicose veins of right lower extremity with inflammation [I83.11]     * Venous (peripheral) insufficiency [I87.2]     * Edema of both lower legs [R60.0]     * Chronic venous hypertension with inflammation involving both sides [I87.323]     PROCEDURE PERFORMED:  1.  Ultrasound-guided access of the right greater saphenous vein  2.  Radiofrequency ablation of the right greater saphenous vein  3.  Ultrasound-guided access of the right anterior accessory saphenous vein  4.  Radiofrequency ablation of the right anterior accessory saphenous vein     SURGEON: Arnaldo Albarado MD     ANESTHESIA: General.    PREPARATION: Routine.    STAFF: Circulator: Evi Landrum RN  Scrub Person: Yoel Garay; Myrna Rod    Estimated Blood Loss: minimal    SPECIMENS: None    COMPLICATIONS: None apparent    INDICATIONS: Silverio Ortiz is a 38 y.o. male who we are currently following for lower extremity edema and symptomatic varicose veins. He returns today after having undergone venous duplex with insufficiency study which I did personally review in the office. It does show evidence of significant reflux in the right greater saphenous vein as well as an anterior accessory saphenous vein. There was also deep venous reflux in the common femoral and popliteal veins. On the left there is significant reflux in the greater saphenous vein only. There was no evidence of DVT. Since our last visit he has been wearing compression in the 20 to 30 mmHg range as well as using leg elevation and exercise. He notes this improves his symptoms with less edema and heaviness as well as less discomfort.  However even with compression he still does continue to have some symptoms and some discomfort especially in the varicosities in the right lower extremity. The indications, risks, and possible complications of the procedure were explained to the patient, who voiced understanding and wished to proceed with surgery.     PROCEDURE IN DETAIL: The patient was taken to the operating room and placed on the operating table in a supine position. After MAC anesthesia was obtained, the right lower extremity was prepped and draped in a sterile manner.  Under ultrasound guidance and using a micropuncture technique the right lower extremity greater saphenous vein was cannulated and a microwire was placed.  This was confirmed under ultrasound.  A small stab incision was made with 11 blade and a 7 Trinidadian sheath was placed.  The patient was placed in Trendelenburg position and the saphenofemoral junction was identified under ultrasound.  The catheter was placed up to the junction and pulled back 3 cm and marked.  Attention was then turned to the anterior accessory saphenous vein in the thigh.  In a similar fashion under ultrasound guidance and using a micropuncture technique the right lower extremity anterior accessory saphenous vein was cannulated and a microwire placed.  Now attention was turned back to the greater saphenous vein and tumescent fluid was instilled along the entire length of the vein under ultrasound guidance.  Once sufficient tumescent fluid was placed radiofrequency ablation had commenced.  There was a total of 8 RF cycles for a total treatment length of 46 cm for a total treatment time of 2 minutes and 40 seconds.  There was an average of 12 W at an average temperature of 120øC.  Upon completion of the ablation the catheter and sheath were removed.  Direct pressure was held for an additional 5-10 minutes to ensure hemostasis.  Attention was then turned back to the anterior accessory saphenous vein.  A small stab  incision was made at the wire exit site with a #11 blade scalpel and the 7 Croatian sheath was placed.  With the patient still in Trendelenburg position the saphenofemoral junction was identified under ultrasound.  The catheter was placed up to the junction and pulled back 3 cm and marked.  Tumescent fluid was then instilled along the entire length of the vein under ultrasound guidance.  Once sufficient tumescent fluid was placed radiofrequency ablation commenced.  There was a total of 3 RF cycles for a total treatment length of 13.5 cm and a total treatment time of 1 minute.  There was an average of 12 W at an average temperature of 120 øC.  Upon completion of the ablation the catheter and sheath were removed.  Direct pressure was held for an additional 5 to 10 minutes to ensure hemostasis.  An Ace wrap was placed from the toes to the groin.  Sterile dressings were applied. The patient tolerated the procedure well. Sponge and needle counts were correct. The patient was then awakened and transferred to the outpatient center in stable condition.

## 2023-08-03 NOTE — H&P
HPI     I had the pleasure of seeing your patient in the office today for follow up.  As you recall, the patient is a 38 y.o. male who we are currently following for lower extremity edema and symptomatic varicose veins.  He returns today after having undergone venous duplex with insufficiency study which I did personally review in the office.  It does show evidence of significant reflux in the right greater saphenous vein as well as an anterior accessory saphenous vein.  There was also deep venous reflux in the common femoral and popliteal veins.  On the left there is significant reflux in the greater saphenous vein only.  There was no evidence of DVT.  Since our last visit he has been wearing compression in the 20 to 30 mmHg range as well as using leg elevation and exercise.  He notes this improves his symptoms with less edema and heaviness as well as less discomfort.  However even with compression he still does continue to have some symptoms and some discomfort especially in the varicosities in the right lower extremity.  He otherwise denies any arterial symptoms with no claudication or rest pain.  He has no other acute complaints today.      Review of Systems   Constitutional: Negative.  Negative for activity change, appetite change, chills, diaphoresis, fatigue and fever.   HENT: Negative.  Negative for congestion, sneezing, sore throat and trouble swallowing.    Eyes: Negative.  Negative for visual disturbance.   Respiratory: Negative.  Negative for chest tightness and shortness of breath.    Cardiovascular: Positive for leg swelling. Negative for chest pain and palpitations.   Gastrointestinal: Negative.  Negative for abdominal distention, abdominal pain, nausea and vomiting.   Endocrine: Negative.    Genitourinary: Negative.    Musculoskeletal: Negative.    Skin: Negative.         Varicose veins mainly on the right lower extremity.  At times these are uncomfortable with prolonged standing and sometimes with  "exercise.   Allergic/Immunologic: Negative.    Neurological: Negative.    Hematological: Negative.    Psychiatric/Behavioral: Negative.        /76 (BP Location: Right arm, Patient Position: Sitting, Cuff Size: Adult)   Pulse 78   Resp 18   Ht 172.7 cm (68\")   Wt 104 kg (230 lb 3.2 oz)   SpO2 98%   BMI 35.00 kg/mý   Physical Exam  Vitals reviewed.   Constitutional:       Appearance: Normal appearance.   HENT:      Head: Normocephalic and atraumatic.      Nose: Nose normal.      Mouth/Throat:      Mouth: Mucous membranes are moist.   Eyes:      Extraocular Movements: Extraocular movements intact.      Pupils: Pupils are equal, round, and reactive to light.   Cardiovascular:      Rate and Rhythm: Normal rate and regular rhythm.      Pulses: Normal pulses.           Carotid pulses are 2+ on the right side and 2+ on the left side.       Radial pulses are 2+ on the right side and 2+ on the left side.        Femoral pulses are 2+ on the right side and 2+ on the left side.       Popliteal pulses are 2+ on the right side and 2+ on the left side.        Dorsalis pedis pulses are 2+ on the right side and 2+ on the left side.        Posterior tibial pulses are 2+ on the right side and 2+ on the left side.      Comments: Mild edema of the bilateral lower extremities from ankle to knee.  Slightly worse on the right compared to the left.  He has some varicosities in the right lower extremity in the medial thigh as well as the medial lower leg.  These are supple with no significant overlying skin changes.  He otherwise has palpable pulses throughout the bilateral lower extremities.  Pulmonary:      Effort: Pulmonary effort is normal. No respiratory distress.   Abdominal:      General: There is no distension.      Palpations: Abdomen is soft. There is no mass.      Tenderness: There is no abdominal tenderness.   Musculoskeletal:         General: Normal range of motion.      Cervical back: Normal range of motion and neck " supple.      Right lower leg: Edema present.      Left lower leg: Edema present.   Skin:     General: Skin is warm and dry.      Capillary Refill: Capillary refill takes less than 2 seconds.   Neurological:      General: No focal deficit present.      Mental Status: He is alert and oriented to person, place, and time.   Psychiatric:         Mood and Affect: Mood normal.         Behavior: Behavior normal.         Thought Content: Thought content normal.         Judgment: Judgment normal.         DIAGNOSTIC DATA:        No results found.    There is no problem list on file for this patient.        ICD-10-CM ICD-9-CM   1. Varicose veins of right lower extremity with inflammation  I83.11 454.1   2. Venous (peripheral) insufficiency  I87.2 459.81   3. Edema of both lower legs  R60.0 782.3   4. Chronic venous hypertension with inflammation involving both sides  I87.323 459.32           PLAN: After thoroughly evaluating Silverio Ortiz, I believe the best course of action is to proceed with right lower extremity greater saphenous vein and anterior accessory saphenous vein radiofrequency ablation.  He returns today after having undergone venous duplex with insufficiency study which I did personally review.  It does show evidence of significant reflux in the bilateral greater saphenous veins, as well as a right anterior accessory saphenous vein.  There was also evidence of deep venous reflux on the right in the common femoral and popliteal veins.  Since our last visit 3 months ago he has been wearing compression in the 20 to 30 mmHg range to the bilateral lower extremities on a daily basis and also using leg elevation and exercise.  While this has worked to improve his symptoms, he still has heaviness, discomfort, and symptoms in his varicosities especially in the right lower extremity.  Given his ongoing symptoms despite conservative management I think he would benefit from right greater saphenous and anterior accessory  saphenous vein radiofrequency ablation. Risks of radiofrequency ablation include, but are not limited to, bleeding, infection, vessel damage, nerve damage, DVT, phlebitis, and pulmonary embolus.  The patient understands these risks and wishes to proceed with procedure.  In the meantime he should continue his compression in the 20 to 30 mmHg range on a daily basis, along with his leg elevation and exercise to continue managing his symptoms.  The patient is to continue taking their medications as previously discussed. Class 2 Severe Obesity (BMI >=35 and <=39.9). Obesity-related health conditions include the following: lower extremity venous stasis disease. Obesity is unchanged. BMI is is above average; BMI management plan is completed. We discussed portion control and increasing exercise. This was all discussed in full with complete understanding.

## 2023-08-03 NOTE — DISCHARGE INSTRUCTIONS
VEIN RADIOFREQUENCY ABLATION DISCHARGE INSTRUCTIONS    Compression therapy  Following the procedure, the initial dressing and the compression wrap should remain in place for 24 hours.  The bandage is generally removed at home the next day. For the first 2 weeks, knee-high compression may be worn during the daytime.  Compression helps minimize swelling and bruising by pressing on the veins that remain in the leg. Without compression, treated veins may open up or bleed.     For first 24 hours following sedation   Do not drive or operate motorized vehicles or equipment.   Do not return to work for 1 week.   Do not assume responsibility for small children or anyone dependent on your care.   Do not drink alcohol.   Do not participate in rough play and sports.   Have a responsible adult stay with until the morning after surgery.    Activity   You may begin walking immediately after your procedure. This is good for you, but don't overdo it. Walk at a relaxed pace.   You should refrain from driving a vehicle if you have continued pain in the operative leg(s), are taking narcotic pain medications such as Percocet, or have any suggestion or evidence of delayed reaction time.    For one week after treatment:   Avoid strenuous exercise.   Avoid sitting in a hot tub, swimming pool, or saunas.   Elevate your leg above the level of your heart periodically (for 15 to 30 minutes) throughout the day.    Bathing   For the first 24 hours following your vein procedure, do not shower    After 24 hours, you may shower without your compression stockings on. Pat your skin dry and put on compression stockings or a wrap back on.    Care of the incision(s)   When you remove the stocking or wrap, wash any incision sites gently with soap and water once a day. Pat the area(s) dry. If your incisions are taped closed, remove the tapes after one week. If an incision site is oozing, place gauze on it under the compression stocking or  wrap.    Discomfort   You may take over-the-counter pain medications such as acetaminophen (Tylenol) or ibuprofen (Advil, Motrin) per package directions. You may also apply an ice (covered with a washcloth) to the tender areas.   Following catheter ablation of the vein, you should expect mild to moderate pain in the thigh with redness in the area of vein closure.    When to contact your physician  If you experience any of the following:   Discomfort not relieved by pain medication.   Fever higher than 100.4 degrees Fahrenheit (38 degrees Celsius)   Significant swelling or pain in the leg   Darkening or ulceration (sores) on areas of skin    POSTOPERATIVE APPOINTMENT:  If you have had radiofrequency ablation of saphenous vein(s), you will have an ultrasound of the treated vein(s) following surgery. You may also have a return appointment on that same day. If you do not have the appointment scheduled at the time of your discharge please call [phone].

## 2023-08-08 ENCOUNTER — TELEPHONE (OUTPATIENT)
Dept: VASCULAR SURGERY | Facility: CLINIC | Age: 38
End: 2023-08-08
Payer: COMMERCIAL

## 2023-08-09 ENCOUNTER — HOSPITAL ENCOUNTER (OUTPATIENT)
Dept: ULTRASOUND IMAGING | Facility: HOSPITAL | Age: 38
Discharge: HOME | End: 2023-08-09
Admitting: SURGERY
Payer: COMMERCIAL

## 2023-08-09 ENCOUNTER — OFFICE VISIT (OUTPATIENT)
Dept: VASCULAR SURGERY | Facility: CLINIC | Age: 38
End: 2023-08-09
Payer: COMMERCIAL

## 2023-08-09 VITALS
BODY MASS INDEX: 34.56 KG/M2 | HEART RATE: 70 BPM | HEIGHT: 68 IN | WEIGHT: 228 LBS | OXYGEN SATURATION: 97 % | SYSTOLIC BLOOD PRESSURE: 116 MMHG | DIASTOLIC BLOOD PRESSURE: 70 MMHG

## 2023-08-09 DIAGNOSIS — I87.323 CHRONIC VENOUS HYPERTENSION WITH INFLAMMATION INVOLVING BOTH SIDES: ICD-10-CM

## 2023-08-09 DIAGNOSIS — I87.2 VENOUS (PERIPHERAL) INSUFFICIENCY: ICD-10-CM

## 2023-08-09 DIAGNOSIS — R60.0 EDEMA OF BOTH LOWER LEGS: ICD-10-CM

## 2023-08-09 DIAGNOSIS — I83.11 VARICOSE VEINS OF RIGHT LOWER EXTREMITY WITH INFLAMMATION: ICD-10-CM

## 2023-08-09 DIAGNOSIS — I83.11 VARICOSE VEINS OF RIGHT LOWER EXTREMITY WITH INFLAMMATION: Primary | ICD-10-CM

## 2023-08-09 PROCEDURE — 93971 EXTREMITY STUDY: CPT

## 2023-08-09 NOTE — PROGRESS NOTES
08/09/2023      Hari Saavedra DO  [address] [facility]  [place] [address]        Silverio Ortiz  1985    Chief Complaint   Patient presents with    Post-op     1 Week post op with testing done today for Venous lower ext. Right. Pt states all has been good since surgery just  to the touch.        Dear Hari Saavedra DO:    HPI     I had the pleasure of seeing your patient in the office today for follow up.  As you recall, the patient is a 38 y.o. male who we are currently following for lower extremity venous insufficiency as well as varicose veins.  He returns today after having undergone recent right lower extremity greater saphenous vein and anterior accessory saphenous vein radiofrequency ablation.  He tolerated this procedure very well and notes improvement in his reflux symptoms with less edema and heaviness in the right leg.  Venous duplex done today shows successful closure of the right greater saphenous vein 1.7 cm distal to the saphenofemoral junction through the mid calf.  The anterior accessory saphenous vein is closed from the proximal to mid thigh.  There is a cluster of varicosities in the medial thigh, as well as in the medial calf that both arise from the greater saphenous vein that are also thrombosed.  Otherwise no evidence of DVT.  He continues to wear compression the 20 to 30 mmHg range.  He has no other acute complaints today.      Review of Systems   Constitutional: Negative.  Negative for activity change, appetite change, chills, diaphoresis, fatigue and fever.   HENT: Negative.  Negative for congestion, sneezing, sore throat and trouble swallowing.    Eyes: Negative.  Negative for visual disturbance.   Respiratory: Negative.  Negative for chest tightness and shortness of breath.    Cardiovascular:  Positive for leg swelling. Negative for chest pain and palpitations.   Gastrointestinal: Negative.  Negative for abdominal distention, abdominal pain, nausea and vomiting.  "  Endocrine: Negative.    Genitourinary: Negative.    Musculoskeletal: Negative.    Skin: Negative.         Varicose veins mainly on the right lower extremity.  At times these are uncomfortable with prolonged standing and sometimes with exercise.   Allergic/Immunologic: Negative.    Neurological: Negative.    Hematological: Negative.    Psychiatric/Behavioral: Negative.       /70   Pulse 70   Ht 173 cm (68.11\")   Wt 103 kg (228 lb)   SpO2 97%   BMI 34.56 kg/mý   Physical Exam  Vitals reviewed.   Constitutional:       Appearance: Normal appearance.   HENT:      Head: Normocephalic and atraumatic.      Nose: Nose normal.      Mouth/Throat:      Mouth: Mucous membranes are moist.   Eyes:      Extraocular Movements: Extraocular movements intact.      Pupils: Pupils are equal, round, and reactive to light.   Cardiovascular:      Rate and Rhythm: Normal rate and regular rhythm.      Pulses: Normal pulses.           Carotid pulses are 2+ on the right side and 2+ on the left side.       Radial pulses are 2+ on the right side and 2+ on the left side.        Femoral pulses are 2+ on the right side and 2+ on the left side.       Popliteal pulses are 2+ on the right side and 2+ on the left side.        Dorsalis pedis pulses are 2+ on the right side and 2+ on the left side.        Posterior tibial pulses are 2+ on the right side and 2+ on the left side.      Comments: Minimal edema of the bilateral lower extremities from ankle to knee.  A cluster of varicosities in the right medial thigh as well as the right medial calf have palpable cords consistent with thrombosis.  This is noted on venous duplex as well today.  Some mild tenderness.  No overlying skin changes otherwise.  Access site on the right lower leg and the right thigh from recent greater saphenous vein and anterior accessory saphenous vein radiofrequency ablation are well-healed.  He has some mild residual ecchymosis which is resolving.  Pulmonary:      " Effort: Pulmonary effort is normal. No respiratory distress.   Abdominal:      General: There is no distension.      Palpations: Abdomen is soft. There is no mass.      Tenderness: There is no abdominal tenderness.   Musculoskeletal:         General: Normal range of motion.      Cervical back: Normal range of motion and neck supple.      Right lower leg: Edema present.      Left lower leg: Edema present.   Skin:     General: Skin is warm and dry.      Capillary Refill: Capillary refill takes less than 2 seconds.   Neurological:      General: No focal deficit present.      Mental Status: He is alert and oriented to person, place, and time.   Psychiatric:         Mood and Affect: Mood normal.         Behavior: Behavior normal.         Thought Content: Thought content normal.         Judgment: Judgment normal.       DIAGNOSTIC DATA:    US Guided Vascular Access    Result Date: 8/4/2023  Narrative: History: Swelling  Comments: Grayscale imaging as well as color flow duplex were used to evaluate the right lower extremity greater saphenous and anterior accessory saphenous veins during venous closure.  The greater saphenous and anterior accessory saphenous veins were successfully cannulated. The catheter was placed up to the junction and pulled back 3 cm and marked.      Impression: Successful endovenous closure of the right lower extremity greater saphenous and anterior accessory saphenous veins. This report was finalized on 08/04/2023 15:48 by Dr. Kurtis Luu MD.     Patient Active Problem List   Diagnosis    Varicose veins of right lower extremity with inflammation    Venous (peripheral) insufficiency    Edema of both lower legs    Chronic venous hypertension with inflammation involving both sides         ICD-10-CM ICD-9-CM   1. Varicose veins of right lower extremity with inflammation  I83.11 454.1   2. Venous (peripheral) insufficiency  I87.2 459.81   3. Edema of both lower legs  R60.0 782.3   4. Chronic venous  hypertension with inflammation involving both sides  I87.323 459.32       Lab Frequency Next Occurrence   Follow Anesthesia Guidelines / Protocol Once 06/14/2023   Obtain Informed Consent Once 06/14/2023   Provide NPO Instructions to Patient Once 06/14/2023   Chlorhexidine Skin Prep Once 06/14/2023       PLAN: After thoroughly evaluating Silverio Ortiz, I believe the best course of action is to remain conservative from a vascular standpoint.  Overall he has done very well after his recent right greater saphenous vein and anterior accessory saphenous vein radiofrequency ablation.  Reflux symptoms are improved.  He has clusters of varicosities in the right thigh on the right calf that originate from the greater saphenous vein and also appear closed/thrombosed on duplex today as is expected.  He does have some mild tenderness at the sites consistent with this thrombophlebitis but it is improving.  Otherwise the right greater saphenous vein is closed successfully 1.7 cm distal to the saphenofemoral junction through the mid calf, and the anterior accessory branch is closed from proximal to mid thigh.  There was no DVT.  He has no real significant reflux symptoms in the left lower extremity.  Moving forward recommendation is continued compression the 2030 mmHg range, leg elevation, and exercise.  He can place warm compress 2-3 times a day for 15 to 20 minutes to the areas of thrombosed varicosities in the right thigh and right calf in these areas will improve on their own.  Otherwise he can return to the office in 6 months for ongoing surveillance.  The patient is to continue taking their medications as previously discussed. BMI is >= 30 and <35. (Class 1 Obesity). The following options were offered after discussion;: exercise counseling/recommendations and nutrition counseling/recommendations. This was all discussed in full with complete understanding.  Thank you for allowing me to participate in the care of your  patient.  Please do not hesitate to call with any questions or concerns.  We will keep you aware of any further encounters with Silverio Ortiz.      Sincerely Yours,      Arnaldo Albarado MD

## 2024-02-13 ENCOUNTER — TELEPHONE (OUTPATIENT)
Dept: VASCULAR SURGERY | Facility: CLINIC | Age: 39
End: 2024-02-13
Payer: COMMERCIAL

## 2024-02-14 ENCOUNTER — OFFICE VISIT (OUTPATIENT)
Dept: VASCULAR SURGERY | Facility: CLINIC | Age: 39
End: 2024-02-14
Payer: COMMERCIAL

## 2024-02-14 VITALS
HEIGHT: 68 IN | DIASTOLIC BLOOD PRESSURE: 80 MMHG | SYSTOLIC BLOOD PRESSURE: 134 MMHG | WEIGHT: 230 LBS | HEART RATE: 52 BPM | OXYGEN SATURATION: 98 % | BODY MASS INDEX: 34.86 KG/M2

## 2024-02-14 DIAGNOSIS — I83.11 VARICOSE VEINS OF RIGHT LOWER EXTREMITY WITH INFLAMMATION: ICD-10-CM

## 2024-02-14 DIAGNOSIS — I87.323 CHRONIC VENOUS HYPERTENSION WITH INFLAMMATION INVOLVING BOTH SIDES: Primary | ICD-10-CM

## (undated) DEVICE — STRIP,CLOSURE,WOUND,MEDI-STRIP,1/2X4: Brand: MEDLINE

## (undated) DEVICE — BANDAGE,GAUZE,BULKEE II,4.5"X4.1YD,STRL: Brand: MEDLINE

## (undated) DEVICE — SYR LL TP 10ML STRL

## (undated) DEVICE — GLV SURG SENSICARE W/ALOE PF LF 7.5 STRL

## (undated) DEVICE — STERILE ULTRASOUND GEL, SAFEWRAP: Brand: ECOVUE

## (undated) DEVICE — BNDG ELAS W/CLIP 6IN 10YD LF STRL

## (undated) DEVICE — STPCK 4WY ON/OFF VLV M/COLAR FIT 45PSI STRL

## (undated) DEVICE — STERILE (14X122CM) TELESCOPICALLY-FOLDED COVER: Brand: CIV-CLEAR™ TRANSDUCER COVER

## (undated) DEVICE — ST INF 2NDARY 20DRP VNT/NOVNT 30IN

## (undated) DEVICE — GLV SURG DERMASSURE GRN LF PF 8.0

## (undated) DEVICE — INTENDED FOR TISSUE SEPARATION, AND OTHER PROCEDURES THAT REQUIRE A SHARP SURGICAL BLADE TO PUNCTURE OR CUT.: Brand: BARD-PARKER ® STAINLESS STEEL BLADES

## (undated) DEVICE — CATH CLS/FST ENDOVENOUS REMFG 7X100CM

## (undated) DEVICE — PAD MINOR UNIVERSAL: Brand: MEDLINE INDUSTRIES, INC.

## (undated) DEVICE — SHEATH INTRO MICRO 7F 11CM

## (undated) DEVICE — ST TB EXT STANDARDBORE 30IN

## (undated) DEVICE — SPNG GZ STRL 2S 4X4 12PLY

## (undated) DEVICE — NEEDLE, QUINCKE, 20GX3.5": Brand: MEDLINE

## (undated) DEVICE — BNDG ADHS CURAD FLX/FABRC 1X3IN STRL LF

## (undated) DEVICE — BNDG ELAS ECON W/CLIP 4IN 5YD LF STRL

## (undated) DEVICE — BAPTIST TURNOVER KIT: Brand: MEDLINE INDUSTRIES, INC.